# Patient Record
Sex: MALE | Race: WHITE | NOT HISPANIC OR LATINO | Employment: OTHER | URBAN - METROPOLITAN AREA
[De-identification: names, ages, dates, MRNs, and addresses within clinical notes are randomized per-mention and may not be internally consistent; named-entity substitution may affect disease eponyms.]

---

## 2022-05-04 DIAGNOSIS — I87.2 VENOUS INSUFFICIENCY (CHRONIC) (PERIPHERAL): ICD-10-CM

## 2022-05-04 DIAGNOSIS — I73.9 PVD (PERIPHERAL VASCULAR DISEASE): Primary | ICD-10-CM

## 2022-05-04 DIAGNOSIS — I73.9 PERIPHERAL VASCULAR DISEASE, UNSPECIFIED: ICD-10-CM

## 2022-05-05 ENCOUNTER — HOSPITAL ENCOUNTER (OUTPATIENT)
Dept: RADIOLOGY | Facility: HOSPITAL | Age: 77
Discharge: HOME | End: 2022-05-05
Attending: THORACIC SURGERY (CARDIOTHORACIC VASCULAR SURGERY)
Payer: MEDICARE

## 2022-05-05 DIAGNOSIS — I87.2 VENOUS INSUFFICIENCY (CHRONIC) (PERIPHERAL): ICD-10-CM

## 2022-05-05 DIAGNOSIS — I73.9 PVD (PERIPHERAL VASCULAR DISEASE): ICD-10-CM

## 2022-05-05 DIAGNOSIS — I73.9 PERIPHERAL VASCULAR DISEASE, UNSPECIFIED: ICD-10-CM

## 2022-05-05 PROCEDURE — 93970 EXTREMITY STUDY: CPT | Mod: 26,,, | Performed by: RADIOLOGY

## 2022-05-05 PROCEDURE — 93970 EXTREMITY STUDY: CPT | Mod: TC,PO

## 2022-05-05 PROCEDURE — 93970 US LOWER EXTREMITY VEINS BILATERAL INSUFFICIENCY: ICD-10-PCS | Mod: 26,,, | Performed by: RADIOLOGY

## 2022-06-23 ENCOUNTER — OFFICE VISIT (OUTPATIENT)
Dept: VASCULAR SURGERY | Facility: CLINIC | Age: 77
End: 2022-06-23
Payer: MEDICARE

## 2022-06-23 VITALS
WEIGHT: 200.63 LBS | HEIGHT: 69 IN | BODY MASS INDEX: 29.71 KG/M2 | HEART RATE: 80 BPM | DIASTOLIC BLOOD PRESSURE: 81 MMHG | SYSTOLIC BLOOD PRESSURE: 133 MMHG

## 2022-06-23 DIAGNOSIS — I87.2 VENOUS INSUFFICIENCY OF BOTH LOWER EXTREMITIES: Primary | ICD-10-CM

## 2022-06-23 PROCEDURE — 99999 PR PBB SHADOW E&M-EST. PATIENT-LVL IV: ICD-10-PCS | Mod: PBBFAC,,, | Performed by: THORACIC SURGERY (CARDIOTHORACIC VASCULAR SURGERY)

## 2022-06-23 PROCEDURE — 3075F PR MOST RECENT SYSTOLIC BLOOD PRESS GE 130-139MM HG: ICD-10-PCS | Mod: CPTII,S$GLB,, | Performed by: THORACIC SURGERY (CARDIOTHORACIC VASCULAR SURGERY)

## 2022-06-23 PROCEDURE — 3079F PR MOST RECENT DIASTOLIC BLOOD PRESSURE 80-89 MM HG: ICD-10-PCS | Mod: CPTII,S$GLB,, | Performed by: THORACIC SURGERY (CARDIOTHORACIC VASCULAR SURGERY)

## 2022-06-23 PROCEDURE — 1159F PR MEDICATION LIST DOCUMENTED IN MEDICAL RECORD: ICD-10-PCS | Mod: CPTII,S$GLB,, | Performed by: THORACIC SURGERY (CARDIOTHORACIC VASCULAR SURGERY)

## 2022-06-23 PROCEDURE — 99205 PR OFFICE/OUTPT VISIT, NEW, LEVL V, 60-74 MIN: ICD-10-PCS | Mod: S$GLB,,, | Performed by: THORACIC SURGERY (CARDIOTHORACIC VASCULAR SURGERY)

## 2022-06-23 PROCEDURE — 3288F PR FALLS RISK ASSESSMENT DOCUMENTED: ICD-10-PCS | Mod: CPTII,S$GLB,, | Performed by: THORACIC SURGERY (CARDIOTHORACIC VASCULAR SURGERY)

## 2022-06-23 PROCEDURE — 99999 PR PBB SHADOW E&M-EST. PATIENT-LVL IV: CPT | Mod: PBBFAC,,, | Performed by: THORACIC SURGERY (CARDIOTHORACIC VASCULAR SURGERY)

## 2022-06-23 PROCEDURE — 3288F FALL RISK ASSESSMENT DOCD: CPT | Mod: CPTII,S$GLB,, | Performed by: THORACIC SURGERY (CARDIOTHORACIC VASCULAR SURGERY)

## 2022-06-23 PROCEDURE — 3075F SYST BP GE 130 - 139MM HG: CPT | Mod: CPTII,S$GLB,, | Performed by: THORACIC SURGERY (CARDIOTHORACIC VASCULAR SURGERY)

## 2022-06-23 PROCEDURE — 1159F MED LIST DOCD IN RCRD: CPT | Mod: CPTII,S$GLB,, | Performed by: THORACIC SURGERY (CARDIOTHORACIC VASCULAR SURGERY)

## 2022-06-23 PROCEDURE — 99205 OFFICE O/P NEW HI 60 MIN: CPT | Mod: S$GLB,,, | Performed by: THORACIC SURGERY (CARDIOTHORACIC VASCULAR SURGERY)

## 2022-06-23 PROCEDURE — 1101F PR PT FALLS ASSESS DOC 0-1 FALLS W/OUT INJ PAST YR: ICD-10-PCS | Mod: CPTII,S$GLB,, | Performed by: THORACIC SURGERY (CARDIOTHORACIC VASCULAR SURGERY)

## 2022-06-23 PROCEDURE — 3079F DIAST BP 80-89 MM HG: CPT | Mod: CPTII,S$GLB,, | Performed by: THORACIC SURGERY (CARDIOTHORACIC VASCULAR SURGERY)

## 2022-06-23 PROCEDURE — 1125F PR PAIN SEVERITY QUANTIFIED, PAIN PRESENT: ICD-10-PCS | Mod: CPTII,S$GLB,, | Performed by: THORACIC SURGERY (CARDIOTHORACIC VASCULAR SURGERY)

## 2022-06-23 PROCEDURE — 1125F AMNT PAIN NOTED PAIN PRSNT: CPT | Mod: CPTII,S$GLB,, | Performed by: THORACIC SURGERY (CARDIOTHORACIC VASCULAR SURGERY)

## 2022-06-23 PROCEDURE — 1101F PT FALLS ASSESS-DOCD LE1/YR: CPT | Mod: CPTII,S$GLB,, | Performed by: THORACIC SURGERY (CARDIOTHORACIC VASCULAR SURGERY)

## 2022-06-23 PROCEDURE — 1160F RVW MEDS BY RX/DR IN RCRD: CPT | Mod: CPTII,S$GLB,, | Performed by: THORACIC SURGERY (CARDIOTHORACIC VASCULAR SURGERY)

## 2022-06-23 PROCEDURE — 1160F PR REVIEW ALL MEDS BY PRESCRIBER/CLIN PHARMACIST DOCUMENTED: ICD-10-PCS | Mod: CPTII,S$GLB,, | Performed by: THORACIC SURGERY (CARDIOTHORACIC VASCULAR SURGERY)

## 2022-06-23 RX ORDER — ALPRAZOLAM 0.25 MG/1
0.25 TABLET ORAL
COMMUNITY

## 2022-06-23 RX ORDER — DUTASTERIDE 0.5 MG/1
0.5 CAPSULE, LIQUID FILLED ORAL
COMMUNITY

## 2022-06-23 RX ORDER — ASPIRIN 81 MG/1
TABLET ORAL
COMMUNITY

## 2022-06-23 RX ORDER — ALBUTEROL SULFATE 90 UG/1
2 AEROSOL, METERED RESPIRATORY (INHALATION) EVERY 6 HOURS PRN
COMMUNITY

## 2022-06-23 RX ORDER — PANTOPRAZOLE SODIUM 40 MG/1
40 TABLET, DELAYED RELEASE ORAL DAILY
COMMUNITY
Start: 2022-06-01

## 2022-06-23 RX ORDER — MIRTAZAPINE 15 MG/1
15 TABLET, FILM COATED ORAL NIGHTLY
COMMUNITY
Start: 2022-05-10

## 2022-06-23 RX ORDER — DAPAGLIFLOZIN 10 MG/1
TABLET, FILM COATED ORAL
COMMUNITY

## 2022-06-23 RX ORDER — CETIRIZINE HYDROCHLORIDE 10 MG/1
10 TABLET ORAL
COMMUNITY

## 2022-06-23 RX ORDER — LEVOTHYROXINE SODIUM 125 UG/1
125 TABLET ORAL EVERY MORNING
COMMUNITY
Start: 2022-04-08

## 2022-06-23 RX ORDER — ZOLPIDEM TARTRATE 5 MG/1
5 TABLET ORAL NIGHTLY
COMMUNITY
Start: 2022-06-08

## 2022-06-23 RX ORDER — ATORVASTATIN CALCIUM 10 MG/1
TABLET, FILM COATED ORAL
COMMUNITY

## 2022-06-23 RX ORDER — TAMSULOSIN HYDROCHLORIDE 0.4 MG/1
1 CAPSULE ORAL DAILY
COMMUNITY
Start: 2022-06-02

## 2022-06-23 RX ORDER — CITALOPRAM 40 MG/1
40 TABLET, FILM COATED ORAL NIGHTLY
COMMUNITY
Start: 2022-03-31

## 2022-06-23 RX ORDER — SPIRONOLACTONE 25 MG/1
TABLET ORAL
COMMUNITY

## 2022-07-09 NOTE — PROGRESS NOTES
OFFICE VISIT      This patient was referred to me by Dr. Vilma Hankins    HISTORY OF PRESENT ILLNESS:  The patient is 77-year-old gentleman who has been experiencing purple discoloration of bilateral feet worse on the left than on the right.  He states that he does have some pain in the lower extremities but symptoms are not classic for claudication or rest pain.  He does have unsteady gait GA IT and has a wide stance with bowed legs.     Past Medical History:   Diagnosis Date    Acute renal insufficiency     COPD (chronic obstructive pulmonary disease)     GERD (gastroesophageal reflux disease)     Hyperlipidemia     Hypothyroidism, unspecified     Polycythemia     ST elevation (STEMI) myocardial infarction of unspecified site     30 YEARS AGO       Past Surgical History:   Procedure Laterality Date    CARDIAC DEFIBRILLATOR PLACEMENT      Thoracic aortic aneursym repair N/A     35 years ago       Review of patient's allergies indicates:   Allergen Reactions    Penicillins Rash       Current Outpatient Medications   Medication Sig Dispense Refill    albuterol (PROVENTIL/VENTOLIN HFA) 90 mcg/actuation inhaler Inhale 2 puffs into the lungs every 6 (six) hours as needed.      ALPRAZolam (XANAX) 0.25 MG tablet Take 0.25 mg by mouth.      aspirin (ECOTRIN) 81 MG EC tablet Aspir-81 mg tablet,delayed release   Take 1 tablet every day by oral route.      atorvastatin (LIPITOR) 10 MG tablet atorvastatin 10 mg tablet   TAKE 1 TABLET BY MOUTH ONCE DAILY      cetirizine (ZYRTEC) 10 MG tablet Take 10 mg by mouth.      citalopram (CELEXA) 40 MG tablet Take 40 mg by mouth nightly.      dapagliflozin (FARXIGA) 10 mg tablet Farxiga 10 mg tablet      EUTHYROX 125 mcg tablet Take 125 mcg by mouth every morning.      fluticasone-umeclidin-vilanter (TRELEGY ELLIPTA) 200-62.5-25 mcg inhaler Trelegy Ellipta 200 mcg-62.5 mcg-25 mcg powder for inhalation   Inhale 1 puff every day by inhalation route.      mirtazapine  (REMERON) 15 MG tablet Take 15 mg by mouth nightly.      pantoprazole (PROTONIX) 40 MG tablet Take 40 mg by mouth once daily.      spironolactone (ALDACTONE) 25 MG tablet spironolactone 25 mg tablet      dutasteride (AVODART) 0.5 mg capsule Take 0.5 mg by mouth.      tamsulosin (FLOMAX) 0.4 mg Cap Take 1 capsule by mouth once daily.      zolpidem (AMBIEN) 5 MG Tab Take 5 mg by mouth nightly.       No current facility-administered medications for this visit.       History reviewed. No pertinent family history.    Social History     Socioeconomic History    Marital status:    Tobacco Use    Smoking status: Former Smoker     Years: 60.00     Types: Cigars    Smokeless tobacco: Former User     Types: Chew       REVIEW OF SYSTEMS:  As per history of present illness.        PHYSICAL EXAM:  Physical Exam    Vitals:    06/23/22 1514   BP: 133/81   Pulse: 80     Awake, alert and oriented.  Head is normocephalic.  Atraumatic.  Pupils are equal, round and reactive.  Sclerae anicteric.  Neck is supple without jugular vein distension and trachea is midline.  Lungs are clear.  Heart is regular rate and rhythm.  Abdomen is soft and nontender.  Extremities are warm and adequately perfused.  There is mild purple discoloration of bilateral feet and toes slightly worse on the left than on the right.  Patient has palpable femoral dorsalis pedis and posterior tibial pulses bilaterally.  Neurologic:  Grossly intact.        US Lower Extremity Veins Bilateral Insufficiency  Order: [id number]   Status: Final result     Visible to patient: No (inaccessible in Patient Portal)     Next appt: None     Dx: Peripheral vascular disease, unspecif...     0 Result Notes    Details    Reading Physician Reading Date Result Priority   Angel Chicas MD  [phone] 5/5/2022 Routine     Narrative & Impression  EXAMINATION:  US LOWER EXTREMITY VEINS BILATERAL INSUFFICIENCY     CLINICAL HISTORY:  Peripheral vascular disease,  unspecified     TECHNIQUE:  Bilateral lower extremity venous Doppler exam including Valsalva or standing maneuvers for evaluation of venous insufficiency of the superficial systems bilaterally.  Reflux times greater than 500 ms were considered indicative of superficial system reflux.     COMPARISON:  None     FINDINGS:  No deep venous thrombosis is demonstrated.     Acquired measurements of vessel diameter and reflux time are as follows:     Right greater saphenous vein:     Saphenofemoral junction = 5.6 mm, 716 ms.     Mid thigh = 3.0 mm, 627 ms.     Knee = 2.4 mm, 527 ms.     Right small saphenous vein:     Saphenopopliteal junction = 2.9 mm, 638 ms.     Left greater saphenous vein:     Saphenofemoral junction = 7.4 mm, 826 ms.     Mid thigh = 2.7 mm, 694 ms.     Knee = 2.9 mm, 804 ms.     Left small saphenous vein:     Saphenopopliteal junction = 5.3 mm, 654 ms.     Impression:     Bilateral lower extremity venous reflux as detailed.  No DVT.        Electronically signed by: Angel Chicas MD  Date:                                            05/05/2022  Time:                                           14:59             Exam Ended: 05/05/22 14:11               US Lower Extremity Arteries Bilateral    Anatomical Region Laterality Modality   Vascular -- Other       Narrative    REASON FOR EXAM: [I73.9]-Peripheral vascular disease, unspecified     DATE OF SERVICE: 03/24/2022     TECHNICAL FACTORS: Arterial duplex examination of both legs was performed using B-mode, color flow and spectral Doppler. Technical quality of exam is adequate.     TECHNOLOGIST: Samantha Sullivan     COMPARISON: None     RIGHT FINDINGS:   CFA PSV: 65.3 cm/s; Triphasic waveform.   DFA PSV: 46.22 cm/s; Triphasic waveform.   SFA Prox PSV: 79.23 cm/s; Triphasic waveform.   SFA Mid PSV: 66.5 cm/s; Triphasic waveform.   SFA Dist PSV: 71.89 cm/s; Triphasic waveform.   Pop PSV: 63.54 cm/s; Triphasic waveform.   PTA PSV: 80.94 cm/s; Triphasic waveform.    DPA PSV: 70.71 cm/s; Triphasic waveform.     LEFT FINDINGS:   CFA PSV: 73.26 cm/s; Triphasic waveform.   DFA PSV: 58.70 cm/s; Biphasic waveform.   SFA Prox PSV: 101.54 cm/s; Triphasic waveform.   SFA Mid PSV: 67.65 cm/s; Triphasic waveform.   SFA Dist PSV: 70.5 cm/s; Triphasic waveform.   Pop PSV: 49.92 cm/s; Triphasic waveform.   PTA PSV: 80.89 cm/s; Triphasic waveform.   DPA PSV: 52.98 cm/s; Triphasic waveform.     COMMENTS: Normal peak systolic velocities and multiphasic waveforms are present throughout the lower extremities.     IMPRESSION:   Right leg: No evidence of hemodynamically significant stenosis based on duplex exam.   Left leg: No evidence of hemodynamically significant stenosis based on duplex exam.       Electronically signed by Campos Mendez MD on 3/26/2022 3:19 PM    IMPRESSION:  1. Discoloration of bilateral feet  2. Venous insufficiency of bilateral lower extremities  3. Polycythemia vera  4. Hyperlipidemia  5. Hypothyroidism  6. Chronic obstructive pulmonary disease  7. History of myocardial infarction       RECOMMENDATIONS:  The patient has discoloration of bilateral feet and toes without any peripheral arterial occlusive disease of significance.  He has triphasic waveforms at all levels on his noninvasive lower extremity arterial studies and has palpable dorsalis pedis and posterior tibial pulses.  He does have mild venous insufficiency and this could cause some discoloration.  In addition he has polycythemia which can also cause engorgement and discoloration.  At this point in time there is no indication for any intervention.  For the mild venous insufficiency I would recommend leg elevation and compression stockings.        Praveen Taylor MD

## 2025-06-09 RX ORDER — HYDROCODONE BITARTRATE AND ACETAMINOPHEN 7.5; 325 MG/1; MG/1
TABLET ORAL
Qty: 28 TABLET | Refills: 0 | OUTPATIENT
Start: 2025-06-09

## 2025-06-23 RX ORDER — ALBUTEROL SULFATE 0.83 MG/ML
SOLUTION RESPIRATORY (INHALATION)
Qty: 75 ML | Refills: 3 | OUTPATIENT
Start: 2025-06-23

## 2025-06-23 NOTE — TELEPHONE ENCOUNTER
Refill Decision Note   Jay Ramírez  is requesting a refill authorization.  Brief Assessment and Rationale for Refill:  Quick Discontinue     Medication Therapy Plan:        Comments:     Note composed:2:46 PM 06/23/2025

## 2025-07-11 RX ORDER — ISOSORBIDE DINITRATE 40 MG/1
TABLET ORAL
Qty: 28 TABLET | Refills: 0 | OUTPATIENT
Start: 2025-07-11

## 2025-07-11 RX ORDER — ESCITALOPRAM OXALATE 10 MG/1
10 TABLET ORAL
Qty: 14 TABLET | Refills: 0 | OUTPATIENT
Start: 2025-07-11

## 2025-07-11 NOTE — TELEPHONE ENCOUNTER
Provider Staff:  Please note Refusal of medication.     Action required for this patient.      Requested Prescriptions     Refused Prescriptions Disp Refills    isosorbide dinitrate (ISORDIL) 40 MG Tab [Pharmacy Med Name: isosorbide dinitrate 40 mg tablet] 28 tablet 0     Sig: TAKE ONE TABLET BY MOUTH IN THE MORNING AND TAKE ONE TABLET BY MOUTH AT BEDTIME (ANGINA)     Refused By: SNEHA BYRNE     Reason for Refusal: Patient unknown to prescriber    EScitalopram oxalate (LEXAPRO) 10 MG tablet [Pharmacy Med Name: escitalopram 10 mg tablet] 14 tablet 0     Sig: TAKE ONE TABLET BY MOUTH DAILY (DEPRESSION)     Refused By: SNEHA BYRNE     Reason for Refusal: Patient unknown to prescriber      Thanks!  [facility]   Note composed: 07/11/2025 4:49 PM

## 2025-07-11 NOTE — TELEPHONE ENCOUNTER
Refill Routing Note   Medication(s) are not appropriate for processing by [facility] for the following reason(s):        Responsible provider unclear    [facility] action(s):  Defer        Medication Therapy Plan: cannot route to Epic noted PCP      Appointments  past 12m or future 3m with PCP    Date Provider   Last Visit   Visit date not found Campos Hernandez MD   Next Visit   Visit date not found Campos Hernandez MD   ED visits in past 90 days: 0        Note composed:4:47 PM 07/11/2025